# Patient Record
Sex: MALE | Race: BLACK OR AFRICAN AMERICAN | Employment: OTHER | URBAN - METROPOLITAN AREA
[De-identification: names, ages, dates, MRNs, and addresses within clinical notes are randomized per-mention and may not be internally consistent; named-entity substitution may affect disease eponyms.]

---

## 2019-08-26 ENCOUNTER — ANESTHESIA EVENT (OUTPATIENT)
Dept: ENDOSCOPY | Age: 64
End: 2019-08-26
Payer: MEDICARE

## 2019-08-26 ENCOUNTER — ANESTHESIA (OUTPATIENT)
Dept: ENDOSCOPY | Age: 64
End: 2019-08-26
Payer: MEDICARE

## 2019-08-26 ENCOUNTER — HOSPITAL ENCOUNTER (OUTPATIENT)
Age: 64
Setting detail: OUTPATIENT SURGERY
Discharge: HOME | End: 2019-08-26
Attending: INTERNAL MEDICINE | Admitting: INTERNAL MEDICINE
Payer: MEDICARE

## 2019-08-26 VITALS
HEART RATE: 58 BPM | TEMPERATURE: 97.6 F | WEIGHT: 162 LBS | RESPIRATION RATE: 10 BRPM | OXYGEN SATURATION: 100 % | HEIGHT: 71 IN | BODY MASS INDEX: 22.68 KG/M2 | SYSTOLIC BLOOD PRESSURE: 109 MMHG | DIASTOLIC BLOOD PRESSURE: 48 MMHG

## 2019-08-26 PROCEDURE — 88305 TISSUE EXAM BY PATHOLOGIST: CPT

## 2019-08-26 PROCEDURE — 76060000031 HC ANESTHESIA FIRST 0.5 HR: Performed by: INTERNAL MEDICINE

## 2019-08-26 PROCEDURE — 77030013992 HC SNR POLYP ENDOSC BSC -B: Performed by: INTERNAL MEDICINE

## 2019-08-26 PROCEDURE — 74011250636 HC RX REV CODE- 250/636: Performed by: NURSE ANESTHETIST, CERTIFIED REGISTERED

## 2019-08-26 PROCEDURE — 76040000019: Performed by: INTERNAL MEDICINE

## 2019-08-26 RX ORDER — FENTANYL CITRATE 50 UG/ML
25-200 INJECTION, SOLUTION INTRAMUSCULAR; INTRAVENOUS
Status: DISCONTINUED | OUTPATIENT
Start: 2019-08-26 | End: 2019-08-26 | Stop reason: HOSPADM

## 2019-08-26 RX ORDER — NALOXONE HYDROCHLORIDE 0.4 MG/ML
0.4 INJECTION, SOLUTION INTRAMUSCULAR; INTRAVENOUS; SUBCUTANEOUS
Status: DISCONTINUED | OUTPATIENT
Start: 2019-08-26 | End: 2019-08-26 | Stop reason: HOSPADM

## 2019-08-26 RX ORDER — SODIUM CHLORIDE 0.9 % (FLUSH) 0.9 %
5-40 SYRINGE (ML) INJECTION AS NEEDED
Status: DISCONTINUED | OUTPATIENT
Start: 2019-08-26 | End: 2019-08-26 | Stop reason: HOSPADM

## 2019-08-26 RX ORDER — SODIUM CHLORIDE 0.9 % (FLUSH) 0.9 %
5-40 SYRINGE (ML) INJECTION EVERY 8 HOURS
Status: DISCONTINUED | OUTPATIENT
Start: 2019-08-26 | End: 2019-08-26 | Stop reason: HOSPADM

## 2019-08-26 RX ORDER — EPINEPHRINE 0.1 MG/ML
1 INJECTION INTRACARDIAC; INTRAVENOUS
Status: DISCONTINUED | OUTPATIENT
Start: 2019-08-26 | End: 2019-08-26 | Stop reason: HOSPADM

## 2019-08-26 RX ORDER — ATROPINE SULFATE 0.1 MG/ML
0.5 INJECTION INTRAVENOUS
Status: DISCONTINUED | OUTPATIENT
Start: 2019-08-26 | End: 2019-08-26 | Stop reason: HOSPADM

## 2019-08-26 RX ORDER — MIDAZOLAM HYDROCHLORIDE 1 MG/ML
.25-5 INJECTION, SOLUTION INTRAMUSCULAR; INTRAVENOUS
Status: DISCONTINUED | OUTPATIENT
Start: 2019-08-26 | End: 2019-08-26 | Stop reason: HOSPADM

## 2019-08-26 RX ORDER — LIDOCAINE HYDROCHLORIDE 20 MG/ML
INJECTION, SOLUTION EPIDURAL; INFILTRATION; INTRACAUDAL; PERINEURAL AS NEEDED
Status: DISCONTINUED | OUTPATIENT
Start: 2019-08-26 | End: 2019-08-26 | Stop reason: HOSPADM

## 2019-08-26 RX ORDER — SODIUM CHLORIDE 9 MG/ML
50 INJECTION, SOLUTION INTRAVENOUS CONTINUOUS
Status: DISCONTINUED | OUTPATIENT
Start: 2019-08-26 | End: 2019-08-26 | Stop reason: HOSPADM

## 2019-08-26 RX ORDER — DEXTROMETHORPHAN/PSEUDOEPHED 2.5-7.5/.8
1.2 DROPS ORAL
Status: DISCONTINUED | OUTPATIENT
Start: 2019-08-26 | End: 2019-08-26 | Stop reason: HOSPADM

## 2019-08-26 RX ORDER — FLUMAZENIL 0.1 MG/ML
0.2 INJECTION INTRAVENOUS
Status: DISCONTINUED | OUTPATIENT
Start: 2019-08-26 | End: 2019-08-26 | Stop reason: HOSPADM

## 2019-08-26 RX ORDER — PROPOFOL 10 MG/ML
INJECTION, EMULSION INTRAVENOUS AS NEEDED
Status: DISCONTINUED | OUTPATIENT
Start: 2019-08-26 | End: 2019-08-26 | Stop reason: HOSPADM

## 2019-08-26 RX ORDER — SODIUM CHLORIDE 9 MG/ML
INJECTION, SOLUTION INTRAVENOUS
Status: DISCONTINUED | OUTPATIENT
Start: 2019-08-26 | End: 2019-08-26 | Stop reason: HOSPADM

## 2019-08-26 RX ORDER — BISMUTH SUBSALICYLATE 262 MG
1 TABLET,CHEWABLE ORAL DAILY
COMMUNITY
End: 2020-03-20

## 2019-08-26 RX ADMIN — LIDOCAINE HYDROCHLORIDE 100 MG: 20 INJECTION, SOLUTION EPIDURAL; INFILTRATION; INTRACAUDAL; PERINEURAL at 09:31

## 2019-08-26 RX ADMIN — PROPOFOL 50 MG: 10 INJECTION, EMULSION INTRAVENOUS at 09:32

## 2019-08-26 RX ADMIN — PROPOFOL 25 MG: 10 INJECTION, EMULSION INTRAVENOUS at 09:49

## 2019-08-26 RX ADMIN — PROPOFOL 25 MG: 10 INJECTION, EMULSION INTRAVENOUS at 09:46

## 2019-08-26 RX ADMIN — PROPOFOL 50 MG: 10 INJECTION, EMULSION INTRAVENOUS at 09:39

## 2019-08-26 RX ADMIN — PROPOFOL 25 MG: 10 INJECTION, EMULSION INTRAVENOUS at 09:36

## 2019-08-26 RX ADMIN — PROPOFOL 25 MG: 10 INJECTION, EMULSION INTRAVENOUS at 09:40

## 2019-08-26 RX ADMIN — SODIUM CHLORIDE: 900 INJECTION, SOLUTION INTRAVENOUS at 09:25

## 2019-08-26 RX ADMIN — PROPOFOL 50 MG: 10 INJECTION, EMULSION INTRAVENOUS at 09:31

## 2019-08-26 NOTE — DISCHARGE INSTRUCTIONS
[address], [address] [place] [address]    COLON DISCHARGE INSTRUCTIONS    Donita Huang  [id number]  1955    Discomfort:  Redness at IV site- apply warm compress to area; if redness or soreness persist- contact your physician  There may be a slight amount of blood passed from the rectum  Gaseous discomfort- walking, belching will help relieve any discomfort  You may not operate a vehicle for 12 hours  You may not engage in an occupation involving machinery or appliances for rest of today  You may not drink alcoholic beverages for at least 12 hours  Avoid making any critical decisions for at least 24 hour  DIET:  You may resume your regular diet - however -  remember your colon is empty and a heavy meal will produce gas. Avoid these foods:  vegetables, fried / greasy foods, carbonated drinks     ACTIVITY:  You may  resume your normal daily activities it is recommended that you spend the remainder of the day resting -  avoid any strenuous activity. CALL M.D. ANY SIGN OF:   Increasing pain, nausea, vomiting  Abdominal distension (swelling)  New increased bleeding (oral or rectal)  Fever (chills)  Pain in chest area  Bloody discharge from nose or mouth  Shortness of breath      Follow-up Instructions:   Call Dr. Santosh Phillips for any questions or problems at [address] [place] [address]:   Your colonoscopy showed 2 polyps, removed, rest of exam was normal.  Telephone # [phone]      Signed By: Santosh Phillips MD     8/26/2019  9:56 AM       DISCHARGE SUMMARY from Nurse    The following personal items collected during your admission are returned to you:   Dental Appliance: Dental Appliances: None  Vision: Visual Aid: At bedside  Hearing Aid:    Jewelry:    Clothing:    Other Valuables:    Valuables sent to safe:      Patient Education        Colon Polyps: Care Instructions  Your Care Instructions    Colon polyps are growths in the colon or the rectum.  The cause of most colon polyps is not known, and most people who get them do not have any problems. But a certain kind can turn into cancer. For this reason, regular testing for colon polyps is important for people as they get older. It is also important for anyone who has an increased risk for colon cancer. Polyps are usually found through routine colon cancer screening tests. Although most colon polyps are not cancerous, they are usually removed and then tested for cancer. Screening for colon cancer saves lives because the cancer can usually be cured if it is caught early. If you have a polyp that is the type that can turn into cancer, you may need more tests to examine your entire colon. The doctor will remove any other polyps that he or she finds, and you will be tested more often. Follow-up care is a key part of your treatment and safety. Be sure to make and go to all appointments, and call your doctor if you are having problems. It's also a good idea to know your test results and keep a list of the medicines you take. How can you care for yourself at home? Regular exams to look for colon polyps are the best way to prevent polyps from turning into colon cancer. These can include stool tests, sigmoidoscopy, colonoscopy, and CT colonography. Talk with your doctor about a testing schedule that is right for you. To prevent polyps  There is no home treatment that can prevent colon polyps. But these steps may help lower your risk for cancer. · Stay active. Being active can help you get to and stay at a healthy weight. Try to exercise on most days of the week. Walking is a good choice. · Eat well. Choose a variety of vegetables, fruits, legumes (such as peas and beans), fish, poultry, and whole grains. · Do not smoke. If you need help quitting, talk to your doctor about stop-smoking programs and medicines. These can increase your chances of quitting for good. · If you drink alcohol, limit how much you drink. Limit alcohol to 2 drinks a day for men and 1 drink a day for women. When should you call for help? Call your doctor now or seek immediate medical care if:    · You have severe belly pain.     · Your stools are maroon or very bloody.    Watch closely for changes in your health, and be sure to contact your doctor if:    · You have a fever.     · You have nausea or vomiting.     · You have a change in bowel habits (new constipation or diarrhea).     · Your symptoms get worse or are not improving as expected. Where can you learn more? Go to http://christie-niall.info/. Enter 95 550241 in the search box to learn more about \"Colon Polyps: Care Instructions. \"  Current as of: December 19, 2018  Content Version: 12.1  © 0082-5032 [employer]. Care instructions adapted under license by [employer] (which disclaims liability or warranty for this information). If you have questions about a medical condition or this instruction, always ask your healthcare professional. [address] any warranty or liability for your use of this information.

## 2019-08-26 NOTE — H&P
[address]  [address]      History and Physical       NAME:  Kamran Larson   :   1955   MRN:   [id number]             History of Present Illness:  Patient is a 59 y.o. who is seen for screening colonoscopy. PMH:  Past Medical History:   Diagnosis Date    Ill-defined condition     stomach cancer       PSH:  Past Surgical History:   Procedure Laterality Date    ABDOMEN SURGERY PROC UNLISTED      \"took some intestines out\"    HX ORTHOPAEDIC      colarbone broke in the [de-identified]    HX OTHER SURGICAL      had a port for chemo       Allergies:  Not on File    Home Medications:  Prior to Admission Medications   Prescriptions Last Dose Informant Patient Reported? Taking? OTHER 2019  Yes Yes   Sig: Indications: stool softener   multivitamin (ONE A DAY) tablet 2019  Yes Yes   Sig: Take 1 Tab by mouth daily. pregabalin (LYRICA PO) 2019  Yes Yes   Sig: Take  by mouth.       Facility-Administered Medications: None       Hospital Medications:  Current Facility-Administered Medications   Medication Dose Route Frequency    0.9% sodium chloride infusion  50 mL/hr IntraVENous CONTINUOUS    sodium chloride (NS) flush 5-40 mL  5-40 mL IntraVENous Q8H    sodium chloride (NS) flush 5-40 mL  5-40 mL IntraVENous PRN    midazolam (VERSED) injection 0.25-5 mg  0.25-5 mg IntraVENous Multiple    fentaNYL citrate (PF) injection  mcg   mcg IntraVENous Multiple    naloxone (NARCAN) injection 0.4 mg  0.4 mg IntraVENous Multiple    flumazenil (ROMAZICON) 0.1 mg/mL injection 0.2 mg  0.2 mg IntraVENous Multiple    simethicone (MYLICON) 27RJ/0.6YM oral drops 80 mg  1.2 mL Oral Multiple    atropine injection 0.5 mg  0.5 mg IntraVENous ONCE PRN    EPINEPHrine (ADRENALIN) 0.1 mg/mL syringe 1 mg  1 mg Endoscopically ONCE PRN     Facility-Administered Medications Ordered in Other Encounters   Medication Dose Route Frequency    0.9% sodium chloride infusion   IntraVENous CONTINUOUS       Social History:  Social History     Tobacco Use    Smoking status: Current Every Day Smoker    Smokeless tobacco: Never Used   Substance Use Topics    Alcohol use: Not Currently     Frequency: Never       Family History:  History reviewed. No pertinent family history. Review of Systems:      Constitutional: negative fever, negative chills, negative weight loss  Eyes:   negative visual changes  ENT:   negative sore throat, tongue or lip swelling  Respiratory:  negative cough, negative dyspnea  Cards:  negative for chest pain, palpitations, lower extremity edema  GI:   See HPI  :  negative for frequency, dysuria  Integument:  negative for rash and pruritus  Heme:  negative for easy bruising and gum/nose bleeding  Musculoskel: negative for myalgias,  back pain and muscle weakness  Neuro: negative for headaches, dizziness, vertigo  Psych:  negative for feelings of anxiety, depression       Objective:     Patient Vitals for the past 8 hrs:   BP Temp Pulse Resp SpO2 Height Weight   08/26/19 0854 107/75 97.8 °F (36.6 °C) (!) 52 16 98 % 5' 11\" (1.803 m) 73.5 kg (162 lb)     No intake/output data recorded. No intake/output data recorded. EXAM:     NEURO-a&o   HEENT-wnl   LUNGS-clear    COR-regular rate and rhythym     ABD-soft , no tenderness, no rebound, good bs     EXT-no edema     Data Review     No results for input(s): WBC, HGB, HCT, PLT, HGBEXT, HCTEXT, PLTEXT in the last 72 hours. No results for input(s): NA, K, CL, CO2, BUN, CREA, GLU, PHOS, CA in the last 72 hours. No results for input(s): SGOT, GPT, AP, TBIL, TP, ALB, GLOB, GGT, AML, LPSE in the last 72 hours. No lab exists for component: AMYP, HLPSE  No results for input(s): INR, PTP, APTT in the last 72 hours. No lab exists for component: INREXT       Assessment:   · Screening colonoscopy   There is no problem list on file for this patient.           Plan:   · Endoscopic procedure with sedation Signed By: Francine Thorpe MD     8/26/2019  9:28 AM

## 2019-08-26 NOTE — PROCEDURES
[address]  [place], [address]      Colonoscopy Operative Report    Loli Chung  [id number]  1955      Procedure Type:   Colonoscopy with polypectomy (snare cautery)     Indications:    Screening colonoscopy   First one    Pre-operative Diagnosis: see indication above    Post-operative Diagnosis:  See findings below    :  Lindsay Mason MD    Surgical Assistant: None    Implants:  None    Referring Provider: None      Sedation:  MAC anesthesia Propofol      Procedure Details:  After informed consent was obtained with all risks and benefits of procedure explained and preoperative exam completed, the patient was taken to the endoscopy suite and placed in the left lateral decubitus position. Upon sequential sedation as per above, a digital rectal exam was performed demonstrating internal hemorrhoids. The Olympus videocolonoscope  was inserted in the rectum and carefully advanced to the cecum, which was identified by the ileocecal valve and appendiceal orifice. The cecum was identified by the ileocecal valve and appendiceal orifice. The quality of preparation was good. The colonoscope was slowly withdrawn with careful evaluation between folds. Retroflexion in the rectum was completed . Findings:   Rectum: normal  Sigmoid: 1 cm polyp in distal sigmoid colon at 30 cm, removed by hot snaring  Descending Colon: normal  Transverse Colon: normal  Ascending Colon: 2 cm pedunculated polyp removed by hot snaring  Cecum: normal        Specimen Removed:  as above    Complications: None. EBL:  None. Impression:    see findings    Recommendations: --Await pathology.       Recommendation for next colonscopy in 3 years      Signed By: Lindsay Mason MD     8/26/2019  9:53 AM

## 2019-08-26 NOTE — ANESTHESIA POSTPROCEDURE EVALUATION
Procedure(s):  COLONOSCOPY  ENDOSCOPIC POLYPECTOMY. MAC    Anesthesia Post Evaluation        Patient location during evaluation: PACU  Patient participation: complete - patient participated  Level of consciousness: awake and alert  Pain management: adequate  Airway patency: patent  Anesthetic complications: no  Cardiovascular status: acceptable  Respiratory status: acceptable  Hydration status: acceptable  Comments: I have seen and evaluated the patient and is ready for discharge.  Earl Burciaga MD    Post anesthesia nausea and vomiting:  none      Vitals Value Taken Time   BP 91/60 8/26/2019 10:06 AM   Temp 36.4 °C (97.6 °F) 8/26/2019 10:01 AM   Pulse 61 8/26/2019 10:06 AM   Resp 15 8/26/2019 10:06 AM   SpO2 100 % 8/26/2019 10:06 AM

## 2019-08-26 NOTE — ANESTHESIA PREPROCEDURE EVALUATION
Relevant Problems   No relevant active problems       Anesthetic History   No history of anesthetic complications            Review of Systems / Medical History  Patient summary reviewed, nursing notes reviewed and pertinent labs reviewed    Pulmonary  Within defined limits        Smoker         Neuro/Psych   Within defined limits           Cardiovascular  Within defined limits                     GI/Hepatic/Renal  Within defined limits              Endo/Other  Within defined limits           Other Findings              Physical Exam    Airway  Mallampati: II  TM Distance: > 6 cm  Neck ROM: normal range of motion   Mouth opening: Normal     Cardiovascular  Regular rate and rhythm,  S1 and S2 normal,  no murmur, click, rub, or gallop             Dental  No notable dental hx       Pulmonary  Breath sounds clear to auscultation               Abdominal  GI exam deferred       Other Findings            Anesthetic Plan    ASA: 2  Anesthesia type: MAC            Anesthetic plan and risks discussed with: Patient

## 2019-12-10 ENCOUNTER — HOSPITAL ENCOUNTER (OUTPATIENT)
Dept: LAB | Age: 64
Discharge: HOME | End: 2019-12-10
Payer: MEDICARE

## 2019-12-10 ENCOUNTER — OFFICE VISIT (OUTPATIENT)
Dept: INTERNAL MEDICINE CLINIC | Age: 64
End: 2019-12-10

## 2019-12-10 VITALS
WEIGHT: 170 LBS | SYSTOLIC BLOOD PRESSURE: 110 MMHG | RESPIRATION RATE: 16 BRPM | OXYGEN SATURATION: 98 % | DIASTOLIC BLOOD PRESSURE: 73 MMHG | TEMPERATURE: 98.2 F | HEIGHT: 71 IN | BODY MASS INDEX: 23.8 KG/M2 | HEART RATE: 61 BPM

## 2019-12-10 DIAGNOSIS — T45.1X5A CHEMOTHERAPY-INDUCED PERIPHERAL NEUROPATHY (HCC): ICD-10-CM

## 2019-12-10 DIAGNOSIS — Z23 ENCOUNTER FOR IMMUNIZATION: ICD-10-CM

## 2019-12-10 DIAGNOSIS — Z11.59 NEED FOR HEPATITIS C SCREENING TEST: ICD-10-CM

## 2019-12-10 DIAGNOSIS — G89.29 CHRONIC RIGHT-SIDED LOW BACK PAIN WITHOUT SCIATICA: ICD-10-CM

## 2019-12-10 DIAGNOSIS — R20.2 PARESTHESIA OF HAND, BILATERAL: ICD-10-CM

## 2019-12-10 DIAGNOSIS — N40.0 BENIGN PROSTATIC HYPERPLASIA WITHOUT LOWER URINARY TRACT SYMPTOMS: ICD-10-CM

## 2019-12-10 DIAGNOSIS — M54.50 CHRONIC RIGHT-SIDED LOW BACK PAIN WITHOUT SCIATICA: ICD-10-CM

## 2019-12-10 DIAGNOSIS — G62.0 CHEMOTHERAPY-INDUCED PERIPHERAL NEUROPATHY (HCC): ICD-10-CM

## 2019-12-10 DIAGNOSIS — Z72.0 TOBACCO USE: ICD-10-CM

## 2019-12-10 DIAGNOSIS — G62.9 NEUROPATHY: ICD-10-CM

## 2019-12-10 DIAGNOSIS — G47.00 INSOMNIA, UNSPECIFIED TYPE: Primary | ICD-10-CM

## 2019-12-10 LAB
BILIRUB UR QL STRIP: NEGATIVE
GLUCOSE UR-MCNC: NEGATIVE MG/DL
KETONES P FAST UR STRIP-MCNC: NEGATIVE MG/DL
PH UR STRIP: 5.5 [PH] (ref 4.6–8)
PROT UR QL STRIP: NEGATIVE
SP GR UR STRIP: 1.02 (ref 1–1.03)
UA UROBILINOGEN AMB POC: NORMAL (ref 0.2–1)
URINALYSIS CLARITY POC: CLEAR
URINALYSIS COLOR POC: NORMAL
URINE BLOOD POC: NEGATIVE
URINE LEUKOCYTES POC: NEGATIVE
URINE NITRITES POC: NEGATIVE

## 2019-12-10 PROCEDURE — 85651 RBC SED RATE NONAUTOMATED: CPT

## 2019-12-10 PROCEDURE — 84153 ASSAY OF PSA TOTAL: CPT

## 2019-12-10 PROCEDURE — 86803 HEPATITIS C AB TEST: CPT

## 2019-12-10 PROCEDURE — 82607 VITAMIN B-12: CPT

## 2019-12-10 PROCEDURE — 80053 COMPREHEN METABOLIC PANEL: CPT

## 2019-12-10 PROCEDURE — 84443 ASSAY THYROID STIM HORMONE: CPT

## 2019-12-10 PROCEDURE — 36415 COLL VENOUS BLD VENIPUNCTURE: CPT

## 2019-12-10 PROCEDURE — 85025 COMPLETE CBC W/AUTO DIFF WBC: CPT

## 2019-12-10 RX ORDER — PREGABALIN 75 MG/1
75 CAPSULE ORAL DAILY
COMMUNITY

## 2019-12-10 RX ORDER — TRAZODONE HYDROCHLORIDE 50 MG/1
50 TABLET ORAL
Qty: 30 TAB | Refills: 3 | Status: SHIPPED | OUTPATIENT
Start: 2019-12-10 | End: 2020-01-02 | Stop reason: SDUPTHER

## 2019-12-10 NOTE — PATIENT INSTRUCTIONS
Learning About How to Have a Healthy Back  What causes back pain? Back pain is often caused by overuse, strain, or injury. For example, people often hurt their backs playing sports or working in the yard, being jolted in a car accident, or lifting something too heavy. Aging plays a part too. Your bones and muscles tend to lose strength as you age, which makes injury more likely. The spongy discs between the bones of the spine (vertebrae) may suffer from wear and tear and no longer provide enough cushion between the bones. A disc that bulges or breaks open (herniated disc) can press on nerves, causing back pain. In some people, back pain is the result of arthritis, broken vertebrae caused by bone loss (osteoporosis), illness, or a spine problem. Although most people have back pain at one time or another, there are steps you can take to make it less likely. How can you have a healthy back? Reduce stress on your back through good posture  Slumping or slouching alone may not cause low back pain. But after the back has been strained or injured, bad posture can make pain worse. · Sleep in a position that maintains your back's normal curves and on a mattress that feels comfortable. Sleep on your side with a pillow between your knees, or sleep on your back with a pillow under your knees. These positions can reduce strain on your back. · Stand and sit up straight. \"Good posture\" generally means your ears, shoulders, and hips are in a straight line. · If you must stand for a long time, put one foot on a stool, ledge, or box. Switch feet every now and then. · Sit in a chair that is low enough to let you place both feet flat on the floor with both knees nearly level with your hips. If your chair or desk is too high, use a footrest to raise your knees. Place a small pillow, a rolled-up towel, or a lumbar roll in the curve of your back if you need extra support.   · Try a kneeling chair, which helps tilt your hips forward. This takes pressure off your lower back. · Try sitting on an exercise ball. It can rock from side to side, which helps keep your back loose. · When driving, keep your knees nearly level with your hips. Sit straight, and drive with both hands on the steering wheel. Your arms should be in a slightly bent position. Reduce stress on your back through careful lifting  · Squat down, bending at the hips and knees only. If you need to, put one knee to the floor and extend your other knee in front of you, bent at a right angle (half kneeling). · Press your chest straight forward. This helps keep your upper back straight while keeping a slight arch in your low back. · Hold the load as close to your body as possible, at the level of your belly button (navel). · Use your feet to change direction, taking small steps. · Lead with your hips as you change direction. Keep your shoulders in line with your hips as you move. · Set down your load carefully, squatting with your knees and hips only. Exercise and stretch your back  · Do some exercise on most days of the week, if your doctor says it is okay. You can walk, run, swim, or cycle. · Stretch your back muscles. Here are a few exercises to try:  ? Lie on your back, and gently pull one bent knee to your chest. Put that foot back on the floor, and then pull the other knee to your chest.  ? Do pelvic tilts. Lie on your back with your knees bent. Tighten your stomach muscles. Pull your belly button (navel) in and up toward your ribs. You should feel like your back is pressing to the floor and your hips and pelvis are slightly lifting off the floor. Hold for 6 seconds while breathing smoothly. ? Sit with your back flat against a wall. · Keep your core muscles strong. The muscles of your back, belly (abdomen), and buttocks support your spine. ? Pull in your belly and imagine pulling your navel toward your spine. Hold this for 6 seconds, then relax.  Remember to keep breathing normally as you tense your muscles. ? Do curl-ups. Always do them with your knees bent. Keep your low back on the floor, and curl your shoulders toward your knees using a smooth, slow motion. Keep your arms folded across your chest. If this bothers your neck, try putting your hands behind your neck (not your head), with your elbows spread apart. ? Lie on your back with your knees bent and your feet flat on the floor. Tighten your belly muscles, and then push with your feet and raise your buttocks up a few inches. Hold this position 6 seconds as you continue to breathe normally, then lower yourself slowly to the floor. Repeat 8 to 12 times. ? If you like group exercise, try Pilates or yoga. These classes have poses that strengthen the core muscles. Lead a healthy lifestyle  · Stay at a healthy weight to avoid strain on your back. · Do not smoke. Smoking increases the risk of osteoporosis, which weakens the spine. If you need help quitting, talk to your doctor about stop-smoking programs and medicines. These can increase your chances of quitting for good. Where can you learn more? Go to http://christie-niall.info/. Enter L315 in the search box to learn more about \"Learning About How to Have a Healthy Back. \"  Current as of: June 26, 2019  Content Version: 12.2  © 3839-2623 [employer]. Care instructions adapted under license by [employer] (which disclaims liability or warranty for this information). If you have questions about a medical condition or this instruction, always ask your healthcare professional. [place] [address] any warranty or liability for your use of this information.

## 2019-12-10 NOTE — PROGRESS NOTES
Rm 9    Chief Complaint   Patient presents with   [address]     pt states he has a hard time having a BM, he may go every 2 days, denies any color change or blood present.  Numbness     tingling in bilateral hands and feet, worse at night    Back Pain   Hx of stomach cancer, diagnosed 2014  Will see oncology on 1/10/20 Dr Anna Pringle removal 2012 Dr. Maldonado  [facility]  Pt has breakfast about 8 am      1. Have you been to the ER, urgent care clinic since your last visit? Hospitalized since your last visit? No    2. Have you seen or consulted any other health care providers outside of the [facility] since your last visit? Include any pap smears or colon screening. No    Health Maintenance Due   Topic Date Due    Hepatitis C Screening  10/14/1952    DTaP/Tdap/Td series (1 - Tdap) 10/14/1964    Shingrix Vaccine Age 50> (1 of 2) 10/14/2003    FOBT Q 1 YEAR AGE 50-75  10/14/2003    GLAUCOMA SCREENING Q2Y  10/14/2018    Pneumococcal 65+ years (1 of 1 - PPSV23) 10/14/2018    MEDICARE YEARLY EXAM  09/26/2019   Colonoscopy done 10/2019  Eye exam done 2 years ago    3 most recent PHQ Screens 12/10/2019   Little interest or pleasure in doing things Not at all   Feeling down, depressed, irritable, or hopeless Not at all   Total Score PHQ 2 0     Fall Risk Assessment, last 12 mths 12/10/2019   Able to walk? Yes   Fall in past 12 months?  No         Learning Assessment 12/10/2019   PRIMARY LEARNER Patient   HIGHEST LEVEL OF EDUCATION - PRIMARY LEARNER  DID NOT GRADUATE HIGH SCHOOL   BARRIERS PRIMARY LEARNER NONE   CO-LEARNER CAREGIVER No   PRIMARY LANGUAGE ENGLISH   LEARNER PREFERENCE PRIMARY LISTENING   ANSWERED BY patient   RELATIONSHIP SELF

## 2019-12-10 NOTE — PROGRESS NOTES
HISTORY OF PRESENT ILLNESS  Alberto Greco is a 62 y.o. male presents to establish care   HPI     Past Medical History:   Diagnosis Date    Cancer [facility]) 2014    stomach    Ill-defined condition     stomach cancer       Current Outpatient Medications on File Prior to Visit   Medication Sig Dispense Refill    pregabalin (LYRICA) 75 mg capsule Take 75 mg by mouth two (2) times a day.  OTHER       pregabalin (LYRICA PO) Take  by mouth.  OTHER Indications: stool softener      multivitamin (ONE A DAY) tablet Take 1 Tab by mouth daily. No current facility-administered medications on file prior to visit. Past Surgical History:   Procedure Laterality Date    ABDOMEN SURGERY PROC UNLISTED      \"took some intestines out\"    COLONOSCOPY N/A 8/26/2019    COLONOSCOPY performed by Yenny Lundy MD at [facility] ENDOSCOPY     Barney broke in the [de-identified]    HX OTHER SURGICAL      had a port for chemo     Hand and feet numb, tingling worse  in feet since 2014 , during and after chemotherapy    Gabapentin ineffective     Fingers sometimes  curl up    Oncologist, Dr iLz Klein t started him on Lyrica. Follow up January 10 [facility]     Right lumbar pain worse in mornings. Better when he gets up and starts moving b    Hx of lumbar disc surgery     Colonoscopy 1 month at ago. \"normal\" needs  3 year follow up. Provider . Dr. Roselia Linares     No prostate exam or PSA    Chronic insomnia, no self treatment     Psychosocial Hx    Single  Lives with sister   Smokes ~ 10 cig daily  No alcohol  Denies illicit drug use, depression or anxiety   Review of Systems   HENT: Negative. Eyes: Positive for blurred vision. Respiratory: Negative. Cardiovascular: Negative for chest pain, palpitations and leg swelling. Gastrointestinal: Positive for constipation. Negative for abdominal pain, heartburn, melena and nausea. Genitourinary: Negative. Musculoskeletal: Positive for back pain. Skin: Negative. Neurological: Positive for sensory change. Negative for dizziness, weakness and headaches. Endo/Heme/Allergies: Negative. Psychiatric/Behavioral: Negative for depression. The patient has insomnia. The patient is not nervous/anxious. Visit Vitals  /73 (BP 1 Location: Left arm, BP Patient Position: Sitting)   Pulse 61   Temp 98.2 °F (36.8 °C) (Oral)   Resp 16   Ht 5' 11\" (1.803 m)   Wt 170 lb (77.1 kg)   SpO2 98%   BMI 23.71 kg/m²     Physical Exam  HENT:      Head: Normocephalic and atraumatic. Nose: Nose normal.      Mouth/Throat:      Mouth: Mucous membranes are moist.      Pharynx: No oropharyngeal exudate or posterior oropharyngeal erythema. Comments: Upper denture, poor dentition   Eyes:      Comments: Glasses    Neck:      Musculoskeletal: Normal range of motion. No muscular tenderness. Thyroid: No thyromegaly or thyroid tenderness. Vascular: No carotid bruit. Cardiovascular:      Rate and Rhythm: Normal rate and regular rhythm. Pulses: Normal pulses. No decreased pulses. Carotid pulses are 2+ on the right side. Dorsalis pedis pulses are 2+ on the right side and 2+ on the left side. Heart sounds: Normal heart sounds. Pulmonary:      Effort: Pulmonary effort is normal.      Breath sounds: Normal breath sounds. Abdominal:      General: A surgical scar is present. Bowel sounds are normal.      Palpations: Abdomen is soft. There is no hepatomegaly, splenomegaly, mass or pulsatile mass. Tenderness: There is no tenderness. Hernia: No hernia is present. Musculoskeletal:      Right hand: He exhibits decreased range of motion and tenderness. He exhibits normal capillary refill. Right lower leg: No edema. Left lower leg: No edema. Comments: Fingers stiff, flexed    Lymphadenopathy:      Cervical: No cervical adenopathy. Skin:     General: Skin is warm and dry.    Neurological:      Mental Status: He is oriented to person, place, and time. Gait: Gait normal.         ASSESSMENT and PLAN    ICD-10-CM ICD-9-CM    1. Insomnia, unspecified type G47.00 780.52 traZODone (DESYREL) 50 mg tablet      REFERRAL TO PHYSICIAL MEDICINE REHAB   2. Chemotherapy-induced peripheral neuropathy (HCC) G62.0 357.7 pregabalin (LYRICA) 75 mg capsule    T45.1X5A E933.1    3. Neuropathy G62.9 355.9 CBC WITH AUTOMATED DIFF      SED RATE (ESR)      TSH AND FREE T4      METABOLIC PANEL, COMPREHENSIVE      VITAMIN B12 & FOLATE   4. Chronic right-sided low back pain without sciatica M54.5 724.2 REFERRAL TO PHYSICIAL MEDICINE REHAB    G89.29 338.29    5. Benign prostatic hyperplasia without lower urinary tract symptoms N40.0 600.00 PSA W/ REFLX FREE PSA      AMB POC URINALYSIS DIP STICK AUTO W/O MICRO   6. Paresthesia of hand, bilateral R20.2 782.0 CBC WITH AUTOMATED DIFF      SED RATE (ESR)      TSH AND FREE T4      VITAMIN B12 & FOLATE      REFERRAL TO PHYSICIAL MEDICINE REHAB   7. Tobacco use Z72.0 305.1    8. Need for hepatitis C screening test Z11.59 V73.89 HEPATITIS C AB   9. Encounter for immunization Z23 V03.89 PNEUMOCOCCAL POLYSACCHARIDE VACCINE, 23-VALENT, ADULT OR IMMUNOSUPPRESSED PT DOSE,      ADMIN PNEUMOCOCCAL VACCINE      CANCELED: ADMIN INFLUENZA VIRUS VAC     Follow-up and Dispositions    · Return in about 4 weeks (around 1/7/2020) for insomia, neuropathy . lab results and schedule of future lab studies reviewed with patient  very strongly urged to quit smoking to reduce cardiovascular risk  cardiovascular risk and specific lipid/LDL goals reviewed  reviewed medications and side effects in detail      Patient voices understanding and acceptance of this advice and will call back if any further questions or concerns. An After Visit Summary was printed and given to the patient.

## 2019-12-11 DIAGNOSIS — R97.20 ELEVATED PSA: Primary | ICD-10-CM

## 2019-12-11 LAB
ALBUMIN SERPL-MCNC: 4.4 G/DL (ref 3.6–4.8)
ALBUMIN/GLOB SERPL: 1.8 {RATIO} (ref 1.2–2.2)
ALP SERPL-CCNC: 124 IU/L (ref 39–117)
ALT SERPL-CCNC: 12 IU/L (ref 0–44)
AST SERPL-CCNC: 25 IU/L (ref 0–40)
BASOPHILS # BLD AUTO: 0.1 X10E3/UL (ref 0–0.2)
BASOPHILS NFR BLD AUTO: 1 %
BILIRUB SERPL-MCNC: 0.3 MG/DL (ref 0–1.2)
BUN SERPL-MCNC: 13 MG/DL (ref 8–27)
BUN/CREAT SERPL: 10 (ref 10–24)
CALCIUM SERPL-MCNC: 9.7 MG/DL (ref 8.6–10.2)
CHLORIDE SERPL-SCNC: 104 MMOL/L (ref 96–106)
CO2 SERPL-SCNC: 23 MMOL/L (ref 20–29)
CREAT SERPL-MCNC: 1.24 MG/DL (ref 0.76–1.27)
EOSINOPHIL # BLD AUTO: 0.5 X10E3/UL (ref 0–0.4)
EOSINOPHIL NFR BLD AUTO: 7 %
ERYTHROCYTE [DISTWIDTH] IN BLOOD BY AUTOMATED COUNT: 13.2 % (ref 12.3–15.4)
ERYTHROCYTE [SEDIMENTATION RATE] IN BLOOD BY WESTERGREN METHOD: 29 MM/HR (ref 0–30)
FOLATE SERPL-MCNC: 9.6 NG/ML
GLOBULIN SER CALC-MCNC: 2.5 G/DL (ref 1.5–4.5)
GLUCOSE SERPL-MCNC: 83 MG/DL (ref 65–99)
HCT VFR BLD AUTO: 40.7 % (ref 37.5–51)
HCV AB S/CO SERPL IA: <0.1 S/CO RATIO (ref 0–0.9)
HGB BLD-MCNC: 13.1 G/DL (ref 13–17.7)
IMM GRANULOCYTES # BLD AUTO: 0 X10E3/UL (ref 0–0.1)
IMM GRANULOCYTES NFR BLD AUTO: 0 %
LYMPHOCYTES # BLD AUTO: 2.5 X10E3/UL (ref 0.7–3.1)
LYMPHOCYTES NFR BLD AUTO: 38 %
MCH RBC QN AUTO: 27.3 PG (ref 26.6–33)
MCHC RBC AUTO-ENTMCNC: 32.2 G/DL (ref 31.5–35.7)
MCV RBC AUTO: 85 FL (ref 79–97)
MONOCYTES # BLD AUTO: 0.4 X10E3/UL (ref 0.1–0.9)
MONOCYTES NFR BLD AUTO: 7 %
NEUTROPHILS # BLD AUTO: 3.1 X10E3/UL (ref 1.4–7)
NEUTROPHILS NFR BLD AUTO: 47 %
PLATELET # BLD AUTO: 242 X10E3/UL (ref 150–450)
POTASSIUM SERPL-SCNC: 4.7 MMOL/L (ref 3.5–5.2)
PROT SERPL-MCNC: 6.9 G/DL (ref 6–8.5)
PSA SERPL-MCNC: 30.6 NG/ML (ref 0–4)
RBC # BLD AUTO: 4.79 X10E6/UL (ref 4.14–5.8)
REFLEX CRITERIA: ABNORMAL
SODIUM SERPL-SCNC: 142 MMOL/L (ref 134–144)
T4 FREE SERPL-MCNC: 1.28 NG/DL (ref 0.82–1.77)
TSH SERPL DL<=0.005 MIU/L-ACNC: 1.72 UIU/ML (ref 0.45–4.5)
VIT B12 SERPL-MCNC: 373 PG/ML (ref 232–1245)
WBC # BLD AUTO: 6.7 X10E3/UL (ref 3.4–10.8)

## 2019-12-11 NOTE — PROGRESS NOTES
Please advise patient prostate number is elevated.  He needs further evaluation by a urologist. See referral

## 2019-12-19 NOTE — PROGRESS NOTES
Detailed message left on secured VM in regards to lab results along with providers recommendations. Result letter along with referral placed in mail.

## 2020-01-02 DIAGNOSIS — G47.00 INSOMNIA, UNSPECIFIED TYPE: ICD-10-CM

## 2020-01-03 RX ORDER — TRAZODONE HYDROCHLORIDE 50 MG/1
50 TABLET ORAL
Qty: 90 TAB | Refills: 1 | Status: SHIPPED | OUTPATIENT
Start: 2020-01-03 | End: 2020-03-20

## 2020-01-07 ENCOUNTER — OFFICE VISIT (OUTPATIENT)
Dept: INTERNAL MEDICINE CLINIC | Age: 65
End: 2020-01-07

## 2020-01-07 VITALS
HEIGHT: 71 IN | SYSTOLIC BLOOD PRESSURE: 115 MMHG | WEIGHT: 170.8 LBS | DIASTOLIC BLOOD PRESSURE: 76 MMHG | BODY MASS INDEX: 23.91 KG/M2 | OXYGEN SATURATION: 98 % | RESPIRATION RATE: 16 BRPM | TEMPERATURE: 97.9 F | HEART RATE: 52 BPM

## 2020-01-07 DIAGNOSIS — G47.00 INSOMNIA, UNSPECIFIED TYPE: ICD-10-CM

## 2020-01-07 DIAGNOSIS — G62.9 NEUROPATHY: Primary | ICD-10-CM

## 2020-01-07 DIAGNOSIS — G62.0 CHEMOTHERAPY-INDUCED PERIPHERAL NEUROPATHY (HCC): ICD-10-CM

## 2020-01-07 DIAGNOSIS — R97.20 ELEVATED PSA: ICD-10-CM

## 2020-01-07 DIAGNOSIS — T45.1X5A CHEMOTHERAPY-INDUCED PERIPHERAL NEUROPATHY (HCC): ICD-10-CM

## 2020-01-07 NOTE — PROGRESS NOTES
Rm 9    Chief Complaint   Patient presents with    Insomnia     No change, per pt    Neuropathy     No change, still happeneing, pt states medication is not working     1. Have you been to the ER, urgent care clinic since your last visit? Hospitalized since your last visit? No    2. Have you seen or consulted any other health care providers outside of the [address] [place] [address] since your last visit? Include any pap smears or colon screening.  No    Health Maintenance Due   Topic Date Due    DTaP/Tdap/Td series (1 - Tdap) 10/14/1967    Shingrix Vaccine Age 50> (1 of 2) 10/14/2006    FOBT Q 1 YEAR AGE 50-75  10/14/2006    MEDICARE YEARLY EXAM  09/26/2019       3 most recent PHQ Screens 1/7/2020   Little interest or pleasure in doing things Not at all   Feeling down, depressed, irritable, or hopeless Not at all   Total Score PHQ 2 0     Learning Assessment 12/10/2019   PRIMARY LEARNER Patient   HIGHEST LEVEL OF EDUCATION - PRIMARY LEARNER  DID NOT GRADUATE HIGH SCHOOL   BARRIERS PRIMARY LEARNER NONE   CO-LEARNER CAREGIVER No   PRIMARY LANGUAGE ENGLISH   LEARNER PREFERENCE PRIMARY LISTENING   ANSWERED BY patient   RELATIONSHIP SELF

## 2020-01-07 NOTE — PROGRESS NOTES
HISTORY OF PRESENT ILLNESS  Alberto Romero is a 62 y.o. male. HPI     Past Medical History:   Diagnosis Date    Cancer [facility]) 2014    stomach    Ill-defined condition     stomach cancer       Current Outpatient Medications   Medication Sig    traZODone (DESYREL) 50 mg tablet Take 1 Tab by mouth nightly.  pregabalin (LYRICA) 75 mg capsule Take 75 mg by mouth two (2) times a day.  OTHER     pregabalin (LYRICA PO) Take  by mouth.  OTHER Indications: stool softener    multivitamin (ONE A DAY) tablet Take 1 Tab by mouth daily. No current facility-administered medications for this visit. No Known Allergies       Past Surgical History:   Procedure Laterality Date    ABDOMEN SURGERY PROC UNLISTED      \"took some intestines out\"    COLONOSCOPY N/A 8/26/2019    COLONOSCOPY performed by Abigail Oviedo MD at [address] [place] [address] broke in the [de-identified]    HX OTHER SURGICAL      had a port for chemo     Unwilling to see new specialist for chronic neuropathy  Current medication ineffective     Will see oncologist  Dr. Yessy Lopez this  Friday    Sleep better with Trazodone     Did not receive result letter or VM. Unaware of elevated PSA      Review of Systems   Constitutional: Negative. Eyes: Negative. Cardiovascular: Negative. Gastrointestinal: Negative. Genitourinary: Negative. Musculoskeletal: Positive for myalgias. Neurological: Positive for sensory change. Visit Vitals  /76 (BP 1 Location: Left arm, BP Patient Position: Sitting)   Pulse (!) 52   Temp 97.9 °F (36.6 °C) (Oral)   Resp 16   Ht 5' 11\" (1.803 m)   Wt 170 lb 12.8 oz (77.5 kg)   SpO2 98%   BMI 23.82 kg/m²       Physical Exam  Constitutional:       Appearance: He is normal weight. Neck:      Musculoskeletal: Normal range of motion and neck supple. Cardiovascular:      Rate and Rhythm: Normal rate and regular rhythm. Skin:     General: Skin is warm and dry.    Neurological:      Mental Status: He is alert. Cranial Nerves: No cranial nerve deficit. Psychiatric:         Mood and Affect: Mood normal.         Behavior: Behavior normal.         Thought Content: Thought content normal.         Judgment: Judgment normal.         ASSESSMENT and PLAN    ICD-10-CM ICD-9-CM    1. Neuropathy G62.9 355.9    2. Chemotherapy-induced peripheral neuropathy (HCC) G62.0 357.7     T45. 1X5A E933.1    3. Insomnia, unspecified type G47.00 780.52    4. Elevated PSA R97.20 790.93      Follow-up and Dispositions    · Return in about 6 months (around 7/7/2020) for insommnia . He declines PMR referral    Advised to increase Trazodone to 2 tablet prn    Strongly encouraged to schedule urology consult, discussed abnormal labs with oncologist       lab results and schedule of future lab studies reviewed with patient  reviewed medications and side effects in detail      Patient voices understanding and acceptance of this advice and will call back if any further questions or concerns. An After Visit Summary was printed and given to the patient.

## 2020-03-20 RX ORDER — SENNOSIDES 8.6 MG/1
1 TABLET ORAL
COMMUNITY

## 2020-03-23 ENCOUNTER — ANESTHESIA (OUTPATIENT)
Dept: ENDOSCOPY | Age: 65
End: 2020-03-23
Payer: MEDICARE

## 2020-03-23 ENCOUNTER — HOSPITAL ENCOUNTER (OUTPATIENT)
Age: 65
Setting detail: OUTPATIENT SURGERY
Discharge: HOME | End: 2020-03-23
Attending: INTERNAL MEDICINE | Admitting: INTERNAL MEDICINE
Payer: MEDICARE

## 2020-03-23 ENCOUNTER — APPOINTMENT (OUTPATIENT)
Dept: ULTRASOUND IMAGING | Age: 65
End: 2020-03-23
Attending: INTERNAL MEDICINE
Payer: MEDICARE

## 2020-03-23 ENCOUNTER — ANESTHESIA EVENT (OUTPATIENT)
Dept: ENDOSCOPY | Age: 65
End: 2020-03-23
Payer: MEDICARE

## 2020-03-23 VITALS
BODY MASS INDEX: 22.59 KG/M2 | RESPIRATION RATE: 12 BRPM | OXYGEN SATURATION: 97 % | DIASTOLIC BLOOD PRESSURE: 72 MMHG | HEART RATE: 71 BPM | WEIGHT: 162 LBS | SYSTOLIC BLOOD PRESSURE: 105 MMHG | TEMPERATURE: 97.1 F

## 2020-03-23 PROCEDURE — 88173 CYTOPATH EVAL FNA REPORT: CPT

## 2020-03-23 PROCEDURE — 74011250636 HC RX REV CODE- 250/636: Performed by: NURSE ANESTHETIST, CERTIFIED REGISTERED

## 2020-03-23 PROCEDURE — 88341 IMHCHEM/IMCYTCHM EA ADD ANTB: CPT

## 2020-03-23 PROCEDURE — 77030036673 HC NDL BIOP ENDOSC SHRKCOR PRELD COVD -E: Performed by: INTERNAL MEDICINE

## 2020-03-23 PROCEDURE — 74011000250 HC RX REV CODE- 250: Performed by: NURSE ANESTHETIST, CERTIFIED REGISTERED

## 2020-03-23 PROCEDURE — 88172 CYTP DX EVAL FNA 1ST EA SITE: CPT

## 2020-03-23 PROCEDURE — 76060000034 HC ANESTHESIA 1.5 TO 2 HR: Performed by: INTERNAL MEDICINE

## 2020-03-23 PROCEDURE — 88342 IMHCHEM/IMCYTCHM 1ST ANTB: CPT

## 2020-03-23 PROCEDURE — 88305 TISSUE EXAM BY PATHOLOGIST: CPT

## 2020-03-23 PROCEDURE — 77030021593 HC FCPS BIOP ENDOSC BSC -A: Performed by: INTERNAL MEDICINE

## 2020-03-23 PROCEDURE — 76040000009: Performed by: INTERNAL MEDICINE

## 2020-03-23 RX ORDER — PROPOFOL 10 MG/ML
INJECTION, EMULSION INTRAVENOUS AS NEEDED
Status: DISCONTINUED | OUTPATIENT
Start: 2020-03-23 | End: 2020-03-23 | Stop reason: HOSPADM

## 2020-03-23 RX ORDER — NALOXONE HYDROCHLORIDE 0.4 MG/ML
0.4 INJECTION, SOLUTION INTRAMUSCULAR; INTRAVENOUS; SUBCUTANEOUS
Status: DISCONTINUED | OUTPATIENT
Start: 2020-03-23 | End: 2020-03-23 | Stop reason: HOSPADM

## 2020-03-23 RX ORDER — SODIUM CHLORIDE 9 MG/ML
INJECTION, SOLUTION INTRAVENOUS
Status: DISCONTINUED | OUTPATIENT
Start: 2020-03-23 | End: 2020-03-23 | Stop reason: HOSPADM

## 2020-03-23 RX ORDER — LIDOCAINE HYDROCHLORIDE 20 MG/ML
INJECTION, SOLUTION EPIDURAL; INFILTRATION; INTRACAUDAL; PERINEURAL AS NEEDED
Status: DISCONTINUED | OUTPATIENT
Start: 2020-03-23 | End: 2020-03-23 | Stop reason: HOSPADM

## 2020-03-23 RX ORDER — SODIUM CHLORIDE 0.9 % (FLUSH) 0.9 %
5-40 SYRINGE (ML) INJECTION AS NEEDED
Status: DISCONTINUED | OUTPATIENT
Start: 2020-03-23 | End: 2020-03-24 | Stop reason: HOSPADM

## 2020-03-23 RX ORDER — DEXTROMETHORPHAN/PSEUDOEPHED 2.5-7.5/.8
1.2 DROPS ORAL
Status: DISCONTINUED | OUTPATIENT
Start: 2020-03-23 | End: 2020-03-24 | Stop reason: HOSPADM

## 2020-03-23 RX ORDER — PHENYLEPHRINE HCL IN 0.9% NACL 0.4MG/10ML
SYRINGE (ML) INTRAVENOUS AS NEEDED
Status: DISCONTINUED | OUTPATIENT
Start: 2020-03-23 | End: 2020-03-23 | Stop reason: HOSPADM

## 2020-03-23 RX ORDER — SODIUM CHLORIDE 9 MG/ML
100 INJECTION, SOLUTION INTRAVENOUS CONTINUOUS
Status: DISCONTINUED | OUTPATIENT
Start: 2020-03-23 | End: 2020-03-23 | Stop reason: HOSPADM

## 2020-03-23 RX ORDER — FENTANYL CITRATE 50 UG/ML
25-200 INJECTION, SOLUTION INTRAMUSCULAR; INTRAVENOUS
Status: DISCONTINUED | OUTPATIENT
Start: 2020-03-23 | End: 2020-03-23 | Stop reason: HOSPADM

## 2020-03-23 RX ORDER — EPINEPHRINE 0.1 MG/ML
1 INJECTION INTRACARDIAC; INTRAVENOUS
Status: DISCONTINUED | OUTPATIENT
Start: 2020-03-23 | End: 2020-03-24 | Stop reason: HOSPADM

## 2020-03-23 RX ORDER — ATROPINE SULFATE 0.1 MG/ML
0.5 INJECTION INTRAVENOUS
Status: DISCONTINUED | OUTPATIENT
Start: 2020-03-23 | End: 2020-03-24 | Stop reason: HOSPADM

## 2020-03-23 RX ORDER — MIDAZOLAM HYDROCHLORIDE 1 MG/ML
.25-1 INJECTION, SOLUTION INTRAMUSCULAR; INTRAVENOUS
Status: DISCONTINUED | OUTPATIENT
Start: 2020-03-23 | End: 2020-03-23 | Stop reason: HOSPADM

## 2020-03-23 RX ORDER — FLUMAZENIL 0.1 MG/ML
0.2 INJECTION INTRAVENOUS
Status: DISCONTINUED | OUTPATIENT
Start: 2020-03-23 | End: 2020-03-23 | Stop reason: HOSPADM

## 2020-03-23 RX ORDER — SODIUM CHLORIDE 0.9 % (FLUSH) 0.9 %
5-40 SYRINGE (ML) INJECTION EVERY 8 HOURS
Status: DISCONTINUED | OUTPATIENT
Start: 2020-03-23 | End: 2020-03-24 | Stop reason: HOSPADM

## 2020-03-23 RX ADMIN — PROPOFOL 20 MG: 10 INJECTION, EMULSION INTRAVENOUS at 10:32

## 2020-03-23 RX ADMIN — PROPOFOL 20 MG: 10 INJECTION, EMULSION INTRAVENOUS at 10:37

## 2020-03-23 RX ADMIN — PROPOFOL 50 MG: 10 INJECTION, EMULSION INTRAVENOUS at 10:30

## 2020-03-23 RX ADMIN — PROPOFOL 20 MG: 10 INJECTION, EMULSION INTRAVENOUS at 10:35

## 2020-03-23 RX ADMIN — PHENYLEPHRINE HYDROCHLORIDE 80 MCG: 10 INJECTION INTRAVENOUS at 10:34

## 2020-03-23 RX ADMIN — PROPOFOL 50 MG: 10 INJECTION, EMULSION INTRAVENOUS at 10:20

## 2020-03-23 RX ADMIN — PROPOFOL 100 MG: 10 INJECTION, EMULSION INTRAVENOUS at 10:24

## 2020-03-23 RX ADMIN — PROPOFOL 20 MG: 10 INJECTION, EMULSION INTRAVENOUS at 10:45

## 2020-03-23 RX ADMIN — PROPOFOL 20 MG: 10 INJECTION, EMULSION INTRAVENOUS at 10:39

## 2020-03-23 RX ADMIN — PROPOFOL 50 MG: 10 INJECTION, EMULSION INTRAVENOUS at 10:17

## 2020-03-23 RX ADMIN — LIDOCAINE HYDROCHLORIDE 40 MG: 20 INJECTION, SOLUTION EPIDURAL; INFILTRATION; INTRACAUDAL; PERINEURAL at 10:19

## 2020-03-23 RX ADMIN — PHENYLEPHRINE HYDROCHLORIDE 80 MCG: 10 INJECTION INTRAVENOUS at 10:37

## 2020-03-23 RX ADMIN — PROPOFOL 50 MG: 10 INJECTION, EMULSION INTRAVENOUS at 10:18

## 2020-03-23 RX ADMIN — PROPOFOL 20 MG: 10 INJECTION, EMULSION INTRAVENOUS at 10:33

## 2020-03-23 RX ADMIN — PROPOFOL 20 MG: 10 INJECTION, EMULSION INTRAVENOUS at 10:48

## 2020-03-23 RX ADMIN — LIDOCAINE HYDROCHLORIDE 60 MG: 20 INJECTION, SOLUTION EPIDURAL; INFILTRATION; INTRACAUDAL; PERINEURAL at 10:16

## 2020-03-23 RX ADMIN — PROPOFOL 50 MG: 10 INJECTION, EMULSION INTRAVENOUS at 10:29

## 2020-03-23 RX ADMIN — PROPOFOL 50 MG: 10 INJECTION, EMULSION INTRAVENOUS at 10:19

## 2020-03-23 RX ADMIN — SODIUM CHLORIDE: 900 INJECTION, SOLUTION INTRAVENOUS at 09:14

## 2020-03-23 RX ADMIN — PROPOFOL 50 MG: 10 INJECTION, EMULSION INTRAVENOUS at 10:25

## 2020-03-23 RX ADMIN — PROPOFOL 50 MG: 10 INJECTION, EMULSION INTRAVENOUS at 10:26

## 2020-03-23 RX ADMIN — PROPOFOL 20 MG: 10 INJECTION, EMULSION INTRAVENOUS at 10:41

## 2020-03-23 RX ADMIN — PROPOFOL 20 MG: 10 INJECTION, EMULSION INTRAVENOUS at 10:43

## 2020-03-23 NOTE — PROCEDURES
[address] [facility], [address] [facility]       Endoscopic Ultrasound    NAME:  Karlie Serrato. :   1955   MRN:   [id number]       Procedure Type: Endoscopic Ultrasound    Indications: h/o gastric cancer recent PET scan showed metastatic disease outside stomach    Pre-operative Diagnosis: see indication above    Post-operative Diagnosis:  See findings below    : Pura Shah MD    Surgical Assistant: None    Implants:  None    Referring Provider: Dr Roxana Hung  Procedure Details:      Anethesia/Sedation:  MAC anesthesia Propofol      Procedure Details   After infom consent was obtained for the procedure, with all risks and benefits of procedure explained the patient was taken to the endoscopy suite and placed in the left lateral decubitus position. Following sequential administration of sedation as per above, the EGD and linear echoendoscope was inserted into the mouth and advanced under direct vision to third portion of the duodenum. A careful inspection was made as the gastroscope was withdrawn, including a retroflexed view of the proximal stomach; findings and interventions are described below. Findings:     Endoscopic:   Esophagus:normal   Stomach: distal gastrectomy, normal anatomy   Duodenum/jejunum: normal      Ultrasound:   Esophagus: normal findings   Stomach: normal findings   Pancreas:     Areas examined: the entire gland, normal    Parenchyma: -normal    Pancreatic Duct: normal findings   Liver:     Parenchyma: normal examined portion       There was around 2 x2 cm hypoechoic area seen outside the stomach, between the posterior wall of stomach and left lobe of liber. I then performed FNB ( fine needle biopsy) with 22 gauge needle ( [employer], [employer]), 5 passes obtained, preliminary reading showed malignant cells       Specimen Removed:  as above    Complications: None. EBL:  None.     Interventions: as above    Recommendations: follow up on final pathology  To follow up with his oncologist, Dr. Caron Huynh By: Vincent Flaherty MD     3/23/2020  11:02 AM

## 2020-03-23 NOTE — PROGRESS NOTES

## 2020-03-23 NOTE — DISCHARGE INSTRUCTIONS
Patient Education        Learning About Coronavirus Disease (COVID-19)  Coronavirus disease (COVID-19): Overview  What is coronavirus disease (COVID-19)? The coronavirus disease 2019 (COVID-19) is caused by a virus. It is an illness that was first found in [place], [place], in December 2019. It has since spread to other countries. The virus can cause fever, cough, and trouble breathing. In severe cases, it can cause pneumonia and make it hard to breathe without help. It can cause death. Coronaviruses are a large group of viruses. They cause the common cold. They also cause more serious illnesses like Middle East respiratory syndrome (MERS) and severe acute respiratory syndrome (SARS). COVID-19 is caused by a novel coronavirus. That means it's a new type that has not been seen in people before. This virus spreads person-to-person through droplets from coughing and sneezing. It may also spread by touching something that has the virus on it, such as a doorknob or a tabletop. What can you do to protect yourself from coronavirus disease (COVID-19)? The best way to protect yourself from getting sick is to:  · Avoid areas where there is an outbreak. · Avoid contact with people who may be infected. · Wash your hands often with soap or alcohol-based hand sanitizers. · Avoid touching your mouth, nose, and eyes with unwashed hands. What can you do to avoid spreading the virus to others? To help avoid spreading the virus to others:  · Cover your mouth with a tissue when you cough or sneeze. Then throw the tissue in the trash. · Use a disinfectant to clean things that you touch often. · Stay home if you are sick. If you must go to the doctor's office, call ahead for instructions and wear a face mask. When to call for help  Call 911 anytime you think you may need emergency care. For example, call if:  · You have severe trouble breathing. · You have severe dehydration.  Symptoms of dehydration include:  ? Dry eyes and a dry mouth. ? Passing only a little urine. ? Feeling thirstier than usual.  · You are extremely confused or not thinking clearly. · You pass out (lose consciousness). Call your doctor now if you develop symptoms such as:  · Shortness of breath. · Fever. · Cough. If you need to get care, call ahead to the doctor's office for instructions before you go. Make sure you wear a face mask when you go there to prevent exposing other people to the virus. Where can you get the latest information? The following health organizations are tracking and studying this virus. Their websites contain the most up-to-date information. Mihai Light also learn what to do if you think you may have been exposed to the virus. · U.S. Centers for Disease Control and Prevention (CDC): The CDC provides updated news about the disease. The website also tells you how to prevent the spread of infection. www.cdc.gov  · World Health Organization [facility]): WHO offers information about the virus outbreaks. WHO also has travel advice. www.who.int  Current as of: March 12, 2020               Content Version: 12.4  © 6654-6051 [employer], [employer]. Care instructions adapted under license by your healthcare professional. If you have questions about a medical condition or this instruction, always ask your healthcare professional. [place] [address] any warranty or liability for your use of this information.        [address]    Endoscopic ultrasound DISCHARGE INSTRUCTIONS    [facility]  [id number]  1955    Discomfort:  Sore throat- throat lozenges or warm salt water gargle  redness at IV site- apply warm compress to area; if redness or soreness persist- contact your physician  Gaseous discomfort- walking, belching will help relieve any discomfort  You may not operate a vehicle for 12 hours  You may not engage in an occupation involving machinery or appliances for rest of today  You may not drink alcoholic beverages for at least 12 hours  Avoid making any critical decisions for at least 24 hour  DIET  You may eat and drink now and after you leave. You may resume your regular diet - however -  remember your colon is empty and a heavy meal will produce gas. Avoid these foods:  vegetables, fried / greasy foods, carbonated drinks    ACTIVITY  You may resume your normal daily activities   Spend the remainder of the day resting -  avoid any strenuous activity. CALL M.D. ANY SIGN OF   Increasing pain, nausea, vomiting  Abdominal distension (swelling)  New increased bleeding (oral or rectal)  Fever (chills)  Pain in chest area  Bloody discharge from nose or mouth  Shortness of breath    Follow-up Instructions:   Call Dr. Ashlee Rios for any questions or problems.   Telephone # [phone]  Follow up with Dr. Dave Nam:   Your endoscopy showed small mass outside the stomach, biopsied, rest of exam was normal.            Signed By: Ashlee Rios MD     3/23/2020  11:08 AM

## 2020-03-23 NOTE — H&P
The patient is a 62year old male who presents with a complaint of Abdominal Pain. The patient presents for new symptoms. The onset of the abdominal pain has been gradual and has been occurring in a persistent pattern for 1 week. The abdominal pain is described as mild fullness and  is described as being located in the epigastrium .  The abdominal pain does not radiate. The symptoms have been associated with constipation,  while the symptoms have not been associated with bloating, bloody stools, black stools, diarrhea, early satiety, nausea or weight loss. There is a surgical history of other (gastrectomy). Previous diagnostic tests have included CT scan and PET scan. The patient had a colonoscopy 7 months ago and  had an EGD 4 year(s) ago . Rain Webb patient has been using no medications for this particular complaint/condition. Note for \"Abdominal Pain\": He had enlarged gastrohepatic lymph node on CT and focal uptake on PET. Problem List/Past Medical Choco Ureña; 3/19/2020 10:48 AM)  Neuropathy (355.9  G62. 9)    History of GI tract cancer (V10.00  Z85.00)    Abnormal finding on GI tract imaging (793.4  R93.3)    Constipation (564.00  K59.00)      Past Surgical History (Cedric Mabry; 3/19/2020 10:48 AM)  Gastrectomy      Allergies Choco Ureña; 3/19/2020 10:48 AM)  No Known Allergies  [11/09/2016]:  No Known Drug Allergies  [11/09/2016]:    Medication History (Cedric Mabry; 3/19/2020 10:48 AM)  Ledell Benders (8.6MG Capsule, Oral daily) Active. traZODone HCl  (50MG Tablet, Oral as needed) Active. Ambien  (10MG Tablet, Oral at bedtime) Active. Compazine  (10MG Capsule ER, Oral prn) Active. Lyrica  (75MG Capsule, Oral as needed) Active. Medications Reconciled     Family History Choco Ureña; 3/19/2020 10:48 AM)  Hypertension   Sister, Mother. Lung Cancer   Brother. Pancreatic cancer (157.9  C25.9)   Brother. Social History Choco Ureña; 3/19/2020 10:48 AM)  Marital status   Single. Tobacco Use   Former smoker. Alcohol Use   Remotely quit alcohol use. Employment status   Disabled. Blood Transfusion   Yes. Diagnostic Studies History Margarita Parra; 3/19/2020 10:48 AM)  None  [11/16/2016]:    Health Maintenance History Margarita Parra; 3/19/2020 10:48 AM)  Flu Vaccine   Date: 2014. Pneumovax   none    Other Problems (Cedric Parra; 3/19/2020 10:48 AM)  Screen for colon cancer (V76.51  Z12.11)          Review of Systems Margarita Parra; 3/19/2020 10:48 AM)  General Not Present- Chronic Fatigue, Poor Appetite, Weight Gain and Weight Loss. Skin Not Present- Itching, Rash and Skin Color Changes. HEENT Not Present- Hearing Loss and Vertigo. Respiratory Not Present- Difficulty Breathing and TB exposure. Cardiovascular Not Present- Chest Pain, Use of Antibiotics before Dental Procedures and Use of Blood Thinners. Gastrointestinal Present- See HPI. Musculoskeletal Not Present- Arthritis, Hip Replacement Surgery and Knee Replacement Surgery. Neurological Not Present- Weakness. Psychiatric Not Present- Depression. Endocrine Not Present- Diabetes and Thyroid Problems. Hematology Not Present- Anemia. Vitals Margarita Parra; 3/19/2020 10:53 AM)  3/19/2020 10:52 AM  Weight: 163.4 lb   Height: 71 in   Body Surface Area: 1.94 m²   Body Mass Index: 22.79 kg/m²    Pulse: 56 (Regular)     BP: 134/84 (Sitting, Left Arm, Standard)              Physical Exam Jed Casiano [facility]; 3/19/2020 12:00 PM)  General  Mental Status - Alert. General Appearance - Cooperative, Pleasant, Not in acute distress. Orientation - Oriented X3. Build & Nutrition - Lean and Well developed. Integumentary  General Characteristics  Color - normal coloration of skin. Temperature - normal warmth is noted. Mobility & Turgor - normal mobility and turgor. Head and Neck  Head - normocephalic, atraumatic with no lesions or palpable masses.   Neck  Global Assessment - full range of motion and supple. Trachea - midline. Eye  Eyeball - Bilateral - No Exophthalmos. Sclera/Conjunctiva - Left - No Jaundice. Sclera/Conjunctiva - Right - No Jaundice. Chest and Lung Exam  Chest and lung exam reveals  - quiet, even and easy respiratory effort with no use of accessory muscles. Auscultation  Breath sounds - Normal. Adventitious sounds - No Adventitious sounds. Cardiovascular  Auscultation  Rhythm - Regular, No Tachycardia, No Bradycardia . Heart Sounds - Normal heart sounds , S1 WNL and S2 WNL, No S3, No Summation Gallop. Murmurs & Other Heart Sounds - Auscultation of the heart reveals - No Murmurs. Abdomen  Inspection  Inspection of the abdomen reveals - Soft. Palpation/Percussion  Tenderness - Generalized (mild). Rebound tenderness - No rebound. Rigidity (guarding) - No Rigidity. Dullness to percussion - No abnormal dullness to percussion. Liver - No hepatosplenomegaly. Abdominal Mass Palpable - No masses. Other Characteristics - No Ascites. Auscultation  Auscultation of the abdomen reveals - Bowel sounds normal, No Abdominal bruits and No Succussion splash. Rectal - Did not examine. Peripheral Vascular  Upper Extremity  Inspection - Left - Normal - No Clubbing, No Cyanosis, No Edema, Pulses Intact. Right - Normal - No Clubbing, No Cyanosis, No Edema, Pulses Intact. Palpation - Edema - Left - No edema. Right - No edema. Neurologic  Neurologic evaluation reveals  - Cranial nerves grossly intact, no focal neurologic deficits. Musculoskeletal  Global Assessment  Gait and Station - normal gait and station. Assessment & Plan (Sugey MalloysLia Casiano [facility]; 3/19/2020 12:03 PM)  History of GI tract cancer (V10.00  Z85.00)  Abnormal finding on GI tract imaging (793.4  R93.3)  Story: EUS 2016 normal  Colonoscopy 8/2019: sigmoid 1 cm serrated polyp; ascending colon 2 cm tubular adenoma; 3 years  CT 1/10/2020: enlarged gastrohepatic lymph node 2.1 x 1. 6  PET 1/23/2020: gastrohepatic lymph node left of midline with focal FDG uptake, highly suspicious for metastatic focus  CEA 1/14/2020: 121  Nito Stout [facility]  Impression: He has a history of gastric adenocarcinoma status post partial gastrectomy 11/2014. He had a normal EUS in 2016. He had enlarged gastrohepatic lymph node on CT and focal uptake on PET in January. Will further evaluate with EUS. Current Plans  Pt Education - How to access health information online: discussed with patient and provided information. EUS (Endoscopic Ultrasound) ([id number])  Patient is to call me for any questions or concerns. Case discussed personally with a physician in the office regarding the presentation, pertinent physical findings and development of a diagnostic and therapeutic plan. Follow up office visit after procedure  This patient was reviewed and seen in collaboration with Dr. Cece Zabala. Date of Surgery Update:  Leticia Leach. was seen and examined. History and physical has been reviewed. The patient has been examined.  There have been no significant clinical changes since the completion of the originally dated History and Physical.    Signed By: Cece Zabala MD     March 23, 2020 10:11 AM

## 2020-03-23 NOTE — ANESTHESIA POSTPROCEDURE EVALUATION
Procedure(s):  ENDOSCOPIC ULTRASOUND (EUS) (ESSENTIAL)  ESOPHAGOGASTRODUODENAL (EGD) BIOPSY  ESOPHAGOGASTRODUODENOSCOPY (EGD). MAC    Anesthesia Post Evaluation      Multimodal analgesia: multimodal analgesia used between 6 hours prior to anesthesia start to PACU discharge  Patient location during evaluation: bedside  Patient participation: waiting for patient participation  Level of consciousness: awake  Pain management: adequate  Airway patency: patent  Anesthetic complications: no  Cardiovascular status: acceptable  Respiratory status: unassisted  Hydration status: acceptable  Comments: Post-Anesthesia Evaluation and Assessment    I have evaluated the patient and they are ready for PACU discharge. Patient: Elizabeth Landis MRN: [id number]  SSN: [id number]   YOB: 1955  Age: 62 y.o. Sex: male      Cardiovascular Function/Vital Signs  /72   Pulse 71   Temp 36.2 °C (97.1 °F)   Resp 12   Wt 73.5 kg (162 lb)   SpO2 97%   BMI 22.59 kg/m²     Patient is status post MAC anesthesia for Procedure(s):  ENDOSCOPIC ULTRASOUND (EUS) (ESSENTIAL)  ESOPHAGOGASTRODUODENAL (EGD) BIOPSY  ESOPHAGOGASTRODUODENOSCOPY (EGD). Nausea/Vomiting: None    Postoperative hydration reviewed and adequate. Pain:  Pain Scale 1: Numeric (0 - 10) (03/23/20 1124)  Pain Intensity 1: 0 (03/23/20 1124)   Managed    Neurological Status: At baseline    Mental Status, Level of Consciousness: Alert and  oriented to person, place, and time    Pulmonary Status:   O2 Device: Room air (03/23/20 1116)   Adequate oxygenation and airway patent    Complications related to anesthesia: None    Post-anesthesia assessment completed.  No concerns    Signed By: Liang Wing MD    March 23, 2020                   Vitals Value Taken Time   /72 3/23/2020 11:24 AM   Temp 36.2 °C (97.1 °F) 3/23/2020 11:10 AM   Pulse 71 3/23/2020 11:24 AM   Resp 12 3/23/2020 11:24 AM   SpO2 97 % 3/23/2020 11:10 AM

## 2020-06-16 ENCOUNTER — HOSPITAL ENCOUNTER (OUTPATIENT)
Dept: NON INVASIVE DIAGNOSTICS | Age: 65
Discharge: HOME | End: 2020-06-16
Attending: INTERNAL MEDICINE
Payer: MEDICARE

## 2020-06-16 DIAGNOSIS — Z13.6 SCREENING FOR ISCHEMIC HEART DISEASE: ICD-10-CM

## 2020-06-16 DIAGNOSIS — C16.2 MALIGNANT NEOPLASM OF BODY OF STOMACH (HCC): ICD-10-CM

## 2020-06-16 DIAGNOSIS — Z01.810 PRE-OPERATIVE CARDIOVASCULAR EXAMINATION: ICD-10-CM

## 2020-06-16 DIAGNOSIS — I82.890 SPLENIC VEIN THROMBOSIS: ICD-10-CM

## 2020-06-16 PROCEDURE — 93356 MYOCRD STRAIN IMG SPCKL TRCK: CPT

## 2020-06-17 VITALS
WEIGHT: 170.86 LBS | HEIGHT: 71 IN | SYSTOLIC BLOOD PRESSURE: 115 MMHG | BODY MASS INDEX: 23.92 KG/M2 | DIASTOLIC BLOOD PRESSURE: 76 MMHG

## 2020-06-17 LAB
AV VELOCITY RATIO: 0.91
ECHO AV AREA PEAK VELOCITY: 2.5 CM2
ECHO AV PEAK GRADIENT: 5.1 MMHG
ECHO AV PEAK VELOCITY: 112.99 CM/S
ECHO LA AREA 4C: 19.7 CM2
ECHO LA MAJOR AXIS: 3.43 CM
ECHO LA VOL 4C: 55.2 ML (ref 18–58)
ECHO LA VOLUME INDEX A4C: 27.99 ML/M2 (ref 16–28)
ECHO LV E' LATERAL VELOCITY: 13.04 CM/S
ECHO LV E' SEPTAL VELOCITY: 9.35 CM/S
ECHO LV INTERNAL DIMENSION DIASTOLIC: 4.53 CM (ref 4.2–5.9)
ECHO LV INTERNAL DIMENSION SYSTOLIC: 2.55 CM
ECHO LV IVSD: 0.7 CM (ref 0.6–1)
ECHO LV MASS 2D: 101.1 G (ref 88–224)
ECHO LV MASS INDEX 2D: 51.3 G/M2 (ref 49–115)
ECHO LV POSTERIOR WALL DIASTOLIC: 0.65 CM (ref 0.6–1)
ECHO LVOT DIAM: 1.89 CM
ECHO LVOT PEAK GRADIENT: 4.2 MMHG
ECHO LVOT PEAK VELOCITY: 103.03 CM/S
ECHO MV A VELOCITY: 78.72 CM/S
ECHO MV E VELOCITY: 76.9 CM/S
ECHO MV E/A RATIO: 0.98
ECHO MV E/E' LATERAL: 5.9
ECHO MV E/E' RATIO (AVERAGED): 7.06
ECHO MV E/E' SEPTAL: 8.22
ECHO RV INTERNAL DIMENSION: 3.41 CM
ECHO RV TAPSE: 2.75 CM (ref 1.5–2)
ECHO TV REGURGITANT MAX VELOCITY: 297.44 CM/S
ECHO TV REGURGITANT PEAK GRADIENT: 35.4 MMHG
LVFS 2D: 43.8 %

## 2020-07-07 RX ORDER — TRAZODONE HYDROCHLORIDE 50 MG/1
TABLET ORAL
Qty: 90 TAB | Refills: 0 | Status: SHIPPED | OUTPATIENT
Start: 2020-07-07

## 2020-07-14 ENCOUNTER — VIRTUAL VISIT (OUTPATIENT)
Dept: INTERNAL MEDICINE CLINIC | Age: 65
End: 2020-07-14

## 2020-07-14 DIAGNOSIS — C79.9 METASTASIS FROM GASTRIC CANCER (HCC): ICD-10-CM

## 2020-07-14 DIAGNOSIS — C16.9 METASTASIS FROM GASTRIC CANCER (HCC): ICD-10-CM

## 2020-07-14 DIAGNOSIS — T45.1X5A CHEMOTHERAPY-INDUCED PERIPHERAL NEUROPATHY (HCC): ICD-10-CM

## 2020-07-14 DIAGNOSIS — C61 PROSTATE CANCER (HCC): ICD-10-CM

## 2020-07-14 DIAGNOSIS — G62.0 CHEMOTHERAPY-INDUCED PERIPHERAL NEUROPATHY (HCC): ICD-10-CM

## 2020-07-14 DIAGNOSIS — G47.00 INSOMNIA, UNSPECIFIED TYPE: Primary | ICD-10-CM

## 2020-07-14 RX ORDER — MORPHINE SULFATE 15 MG/1
TABLET ORAL
COMMUNITY
Start: 2020-07-10

## 2020-07-14 RX ORDER — FENTANYL 25 UG/1
PATCH TRANSDERMAL
COMMUNITY
Start: 2020-07-10

## 2020-07-14 RX ORDER — PREDNISONE 20 MG/1
60 TABLET ORAL
COMMUNITY
Start: 2020-07-10

## 2020-07-14 RX ORDER — DICYCLOMINE HYDROCHLORIDE 10 MG/1
10 CAPSULE ORAL
COMMUNITY
Start: 2020-07-10

## 2020-07-14 RX ORDER — DEXAMETHASONE 2 MG/1
TABLET ORAL
COMMUNITY

## 2020-07-14 NOTE — PROGRESS NOTES
Virtual visit    Chief Complaint   Patient presents with    Insomnia     follow up      1. Have you been to the ER, urgent care clinic since your last visit? Hospitalized since your last visit? Yes Reason for visit: 7/4/20, HDH, stomach pain and cramping. 2. Have you seen or consulted any other health care providers outside of the [address] [place] [address] since your last visit? Include any pap smears or colon screening. No    Health Maintenance Due   Topic Date Due    DTaP/Tdap/Td series (1 - Tdap) 10/14/1977    Lipid Screen  10/14/1996    Shingrix Vaccine Age 50> (1 of 2) 10/14/2006    FOBT Q1Y Age 50-75  10/14/2006    Medicare Yearly Exam  09/26/2019     Abuse Screening Questionnaire 7/14/2020   Do you ever feel afraid of your partner? N   Are you in a relationship with someone who physically or mentally threatens you? N   Is it safe for you to go home?  Y       3 most recent PHQ Screens 7/14/2020   Little interest or pleasure in doing things Not at all   Feeling down, depressed, irritable, or hopeless Several days   Total Score PHQ 2 1     Learning Assessment 7/14/2020   PRIMARY LEARNER Patient   HIGHEST LEVEL OF EDUCATION - PRIMARY LEARNER  -   BARRIERS PRIMARY LEARNER NONE   CO-LEARNER CAREGIVER -   PRIMARY LANGUAGE ENGLISH   LEARNER PREFERENCE PRIMARY DEMONSTRATION   ANSWERED BY patient   RELATIONSHIP SELF

## 2020-07-16 NOTE — PROGRESS NOTES
Subjective  Alberto Browning is a 62 y.o. male presents for routine visit     Alberto Browning, who was evaluated through a synchronous (real-time) audio-video encounter, and/or his healthcare decision maker, is aware that it is a billable service, with coverage as determined by his insurance carrier. He provided verbal consent to proceed: Yes, and patient identification was verified. It was conducted pursuant to the emergency declaration under the [facility], [address] [place] [address] authority and the [employer] Act. A caregiver was present when appropriate. Ability to conduct physical exam was limited. I was in the office. The patient was at home. HPI   He reports insomnia somewhat better with Trazodone  He is receiving chemotherapy for recurrent , metastatic gastric cancer, s/p partial gastrectomy   Sees urologist for newly diagnosed prostate cancer, treatment options being discussed   Appetite recently improved after weight loss of 10-15 lbs   Currently on Fentanyl patch and MSo4 for chronic  Pain syndrome  Lives with sister, Wanda Heredia. Past Medical History:   Diagnosis Date    Cancer [facility]) 2014    stomach - chemotherapy/intestine - surgery    Ill-defined condition     stomach cancer       Past Surgical History:   Procedure Laterality Date    ABDOMEN SURGERY PROC UNLISTED      \"took some intestines out\" - cancer    COLONOSCOPY N/A 8/26/2019    COLONOSCOPY performed by Cindy Burr MD at [address] [place] [address] in the [de-identified]    HX OTHER SURGICAL      had a port for chemo       Current Outpatient Medications   Medication Sig    morphine IR (MS IR) 15 mg tablet     fentaNYL (DURAGESIC) 25 mcg/hr PATCH     POLYETHYLENE GLYCOL 3350 PO 17 g as needed.     dicyclomine (BENTYL) 10 mg capsule 10 mg.    dexAMETHasone (DECADRON) 2 mg tablet     predniSONE (DELTASONE) 20 mg tablet 60 mg.    traZODone (DESYREL) 50 mg tablet TAKE 1 TABLET BY MOUTH EVERY DAY AT NIGHT    senna (Senokot) 8.6 mg tablet Take 1 Tab by mouth nightly.  pregabalin (LYRICA) 75 mg capsule Take 75 mg by mouth daily. No current facility-administered medications for this visit. No Known Allergies    Review of Systems   Constitutional: Positive for malaise/fatigue and weight loss. HENT: Negative. Eyes: Negative. Respiratory: Negative. Cardiovascular: Negative. Musculoskeletal: Positive for myalgias. Neurological: Negative for dizziness and headaches. Psychiatric/Behavioral: Negative for depression. The patient has insomnia. Objective  Physical Exam     Assessment & Plan    ICD-10-CM ICD-9-CM    1. Insomnia, unspecified type  G47.00 780.52    2. Metastasis from gastric cancer (HCC)  C79.9 199.1     C16.9 151.9    3. [facility]  C61 185    4. Chemotherapy-induced peripheral neuropathy (HCC)  G62.0 357.7     T45. 1X5A E933.1      Follow-up and Dispositions    · Return in about 4 weeks (around 8/11/2020), or if symptoms worsen or fail to improve, for insomnia . instructed to increase Trazodone to 2 tablet daily.  Consider addition of  antidepressant/antianxiety medication if no improvement   reviewed medications and side effects in detail    Encouraged follow up with specialists   Donal Motley NP

## (undated) DEVICE — SYSTEM BX DIA22GA FN W/ PRE LD NDL SHARKCORE

## (undated) DEVICE — TRAP SURG QUAD PARABOLA SLOT DSGN SFTY SCRN TRAPEASE

## (undated) DEVICE — FORCEPS BX L240CM JAW DIA2.8MM L CAP W/ NDL MIC MESH TOOTH

## (undated) DEVICE — TUBING HYDR IRR --

## (undated) DEVICE — SNARE ENDOSCP M L240CM W27MM SHTH DIA2.4MM CHN 2.8MM OVL

## (undated) DEVICE — REM POLYHESIVE ADULT PATIENT RETURN ELECTRODE: Brand: VALLEYLAB